# Patient Record
Sex: MALE | Race: WHITE
[De-identification: names, ages, dates, MRNs, and addresses within clinical notes are randomized per-mention and may not be internally consistent; named-entity substitution may affect disease eponyms.]

---

## 2022-12-20 ENCOUNTER — HOSPITAL ENCOUNTER (EMERGENCY)
Dept: HOSPITAL 47 - EC | Age: 37
Discharge: HOME | End: 2022-12-20
Payer: COMMERCIAL

## 2022-12-20 VITALS
HEART RATE: 96 BPM | RESPIRATION RATE: 20 BRPM | SYSTOLIC BLOOD PRESSURE: 149 MMHG | DIASTOLIC BLOOD PRESSURE: 83 MMHG | TEMPERATURE: 98 F

## 2022-12-20 DIAGNOSIS — S82.832A: Primary | ICD-10-CM

## 2022-12-20 DIAGNOSIS — E11.9: ICD-10-CM

## 2022-12-20 DIAGNOSIS — I10: ICD-10-CM
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
## 2022-12-20 DIAGNOSIS — F17.200: ICD-10-CM

## 2022-12-20 PROCEDURE — 99283 EMERGENCY DEPT VISIT LOW MDM: CPT

## 2022-12-20 PROCEDURE — 29515 APPLICATION SHORT LEG SPLINT: CPT

## 2022-12-20 NOTE — XR
EXAMINATION TYPE: XR ankle complete LT

 

DATE OF EXAM: 12/20/2022

 

COMPARISON: NONE

 

HISTORY: Pain

 

TECHNIQUE: 3 views of the left ankle are submitted for evaluation.

 

FINDINGS: Oblique fracture distal fibula with displacement of 3 mm at and proximal to the ankle morti
se.

 

No Additional fracture seen. Soft tissue swelling identified.

 

IMPRESSION:

1. Distal fibular fracture.

## 2022-12-20 NOTE — ED
Lower Extremity Injury HPI





- General


Chief Complaint: Extremity Injury, Lower


Stated Complaint: lt ankle injury


Time Seen by Provider: 12/20/22 09:33


Source: patient, RN notes reviewed


Mode of arrival: ambulatory


Limitations: no limitations





- History of Present Illness


Initial Comments: 


37-year-old male presents emergency Department with chief complaint of left 

ankle injury.  Patient states he injured it on Saturday when he lost his balance

was ankle went down some steps.  Patient states is swollen, painful.  Patient 

states only hurts when he puts weight on it.  No paresthesias he does admit to 

bruising, swelling the lateral portion








- Related Data


                                Home Medications











 Medication  Instructions  Recorded  Confirmed


 


No Known Home Medications  12/20/22 12/20/22











                                    Allergies











Allergy/AdvReac Type Severity Reaction Status Date / Time


 


No Known Allergies Allergy   Verified 12/20/22 10:21














Review of Systems


ROS Statement: 


Those systems with pertinent positive or pertinent negative responses have been 

documented in the HPI.





ROS Other: All systems not noted in ROS Statement are negative.





Past Medical History


Past Medical History: Diabetes Mellitus, Hypertension


History of Any Multi-Drug Resistant Organisms: None Reported


Past Surgical History: No Surgical Hx Reported


Past Psychological History: No Psychological Hx Reported


Smoking Status: Current every day smoker


Past Alcohol Use History: Occasional


Past Drug Use History: None Reported





General Exam


Limitations: no limitations


General appearance: alert, in no apparent distress


Head exam: Present: atraumatic, normocephalic, normal inspection


Eye exam: Present: normal appearance, PERRL, EOMI.  Absent: scleral icterus, 

conjunctival injection, periorbital swelling


Neck exam: Present: normal inspection, full ROM.  Absent: tenderness, 

meningismus, lymphadenopathy


Respiratory exam: Present: normal lung sounds bilaterally.  Absent: respiratory 

distress, wheezes, rales, rhonchi, stridor


Cardiovascular Exam: Present: regular rate, normal rhythm, normal heart sounds. 

Absent: systolic murmur, diastolic murmur, rubs, gallop, clicks


Extremities exam: Present: other (Left lateral malleoli region ecchymosis, 

swelling times palpation no proximal tib-fib tenderness no foot tenderness 

neurovascular intact)





Course


                                   Vital Signs











  12/20/22





  09:29


 


Temperature 98 F


 


Pulse Rate 96


 


Respiratory 20





Rate 


 


Blood Pressure 149/83


 


O2 Sat by Pulse 99





Oximetry 














Procedures





- Orthopedic Splinting/Casting


  ** Injury #1


Side: left


Lower Extremity Injury Location: short leg, ankle


Lower Extremity Immobilizer: posterior splint, synthetic pre-padded splint


Other Orthopedic Equipment: crutches


Additional Comments: 





This procedure was performed by me patient is neurovascular intact before and 

after procedure





Medical Decision Making





- Medical Decision Making


Patient has left distal fibular fracture this was splinted is neurovascularly 

intact patient has crutches will be discharged follow-up with orthopedics.  

Patient to remain nonweightbearing








Disposition


Clinical Impression: 


 Fracture of distal end of left fibula





Disposition: HOME SELF-CARE


Condition: Stable


Instructions (If sedation given, give patient instructions):  Leg Fracture (ED)


Additional Instructions: 


Please return to the Emergency Department if symptoms worsen or any other 

concerns.


Is patient prescribed a controlled substance at d/c from ED?: No


Referrals: 


None,Stated [Primary Care Provider] - 1-2 days


Peter Samaniego MD [STAFF PHYSICIAN] - 1-2 days


Time of Disposition: 10:03

## 2025-01-21 ENCOUNTER — HOSPITAL ENCOUNTER (OUTPATIENT)
Dept: HOSPITAL 47 - RADXRYALE | Age: 40
Discharge: HOME | End: 2025-01-21
Attending: PHYSICIAN ASSISTANT
Payer: COMMERCIAL

## 2025-01-21 DIAGNOSIS — M51.369: Primary | ICD-10-CM

## 2025-01-21 PROCEDURE — 72110 X-RAY EXAM L-2 SPINE 4/>VWS: CPT

## 2025-01-21 NOTE — XR
EXAMINATION TYPE: XR lumbosacral spine min 4V

 

DATE OF EXAM: 1/21/2025 4:56 PM

 

COMPARISON: None

 

CLINICAL INDICATION: Male, 39 years old with history of  LBP; [facility], pain

 

TECHNIQUE: XR lumbosacral spine min 4V - Frontal, lateral , bilateral oblique and coned in L5-S1 late
ral views of the spine.

 

FINDINGS: No evidence of any acute osseous pathology.  No evidence of loss of vertebral body height i
s seen. There is normal alignment of the lumbar vertebral bodies. No significant degeneration changes
 throughout the spine.

 

IMPRESSION: 

1. No acute fracture.

2. Mild multilevel disc degeneration.

 

[facility], Workstation: [id number], 1/21/2025 5:02 PM

## 2025-01-31 ENCOUNTER — HOSPITAL ENCOUNTER (OUTPATIENT)
Dept: HOSPITAL 47 - RADMRIMAIN | Age: 40
Discharge: HOME | End: 2025-01-31
Attending: FAMILY MEDICINE
Payer: COMMERCIAL

## 2025-01-31 DIAGNOSIS — M54.16: Primary | ICD-10-CM

## 2025-01-31 PROCEDURE — 72148 MRI LUMBAR SPINE W/O DYE: CPT

## 2025-02-01 NOTE — MR
INDICATION: 

Patient age:Male;  39 years old; 

Reason for study: M54.41 LUMBAGO WITH SCIATICA, RIGHT SIDE; [facility].

 

COMPARISONS: Lumbosacral spine radiograph 1/21/2025.

 

TECHNIQUE:  Multi planar, multi sequence imaging was performed utilizing: T1-weighted, T2-weighted, a
nd turbo inversion recovery imaging of the lumbar spine. The patient was not given contrast.

 

FINDINGS:  The lumbar vertebral bodies do have preserved heights and alignment.  Minimal disc desicca
tion at L2-L3. No abnormal STIR signal.  The conus medullaris and the distal spinal cord do appear un
remarkable with regards to their signal intensity and morphology. 

 

L1-L2:  No significant disc pathology is identified. The spinal canal and neural foramen are patent. 
 

 

L2-L3:  No significant disc pathology is identified. The spinal canal and neural foramen are patent.

 

L3-L4:  No significant disc pathology is identified. The spinal canal and neural foramen are patent.

 

L4-L5:  No significant disc pathology is identified. The spinal canal and neural foramen are patent

 

L5-S1:  No significant disc pathology is identified. The spinal canal and neural foramen are patent

 

Other significant findings: None.

 

IMPRESSION:

No definitive evidence for disc herniation or significant spinal canal stenosis. No significant degen
erative disc disease identified.

 

 

 

 

 

[facility], Workstation: [id number], 2/1/2025 9:19 AM